# Patient Record
Sex: FEMALE | Race: WHITE | NOT HISPANIC OR LATINO
[De-identification: names, ages, dates, MRNs, and addresses within clinical notes are randomized per-mention and may not be internally consistent; named-entity substitution may affect disease eponyms.]

---

## 2017-11-18 ENCOUNTER — TRANSCRIPTION ENCOUNTER (OUTPATIENT)
Age: 27
End: 2017-11-18

## 2020-03-09 ENCOUNTER — APPOINTMENT (OUTPATIENT)
Dept: ORTHOPEDIC SURGERY | Facility: CLINIC | Age: 30
End: 2020-03-09
Payer: COMMERCIAL

## 2020-03-09 DIAGNOSIS — M54.5 LOW BACK PAIN: ICD-10-CM

## 2020-03-09 DIAGNOSIS — M51.36 OTHER INTERVERTEBRAL DISC DEGENERATION, LUMBAR REGION: ICD-10-CM

## 2020-03-09 PROCEDURE — 72100 X-RAY EXAM L-S SPINE 2/3 VWS: CPT

## 2020-03-09 PROCEDURE — 99204 OFFICE O/P NEW MOD 45 MIN: CPT

## 2020-04-27 ENCOUNTER — APPOINTMENT (OUTPATIENT)
Dept: ORTHOPEDIC SURGERY | Facility: CLINIC | Age: 30
End: 2020-04-27
Payer: COMMERCIAL

## 2020-04-27 DIAGNOSIS — M51.26 OTHER INTERVERTEBRAL DISC DISPLACEMENT, LUMBAR REGION: ICD-10-CM

## 2020-04-27 PROCEDURE — 99214 OFFICE O/P EST MOD 30 MIN: CPT | Mod: 95

## 2020-04-27 NOTE — REASON FOR VISIT
[Home] : at home, [unfilled] , at the time of the visit. [Other Location: e.g. Home (Enter Location, City,State)___] : at [unfilled] [Patient] : the patient [Follow-Up Visit] : a follow-up visit for

## 2020-04-27 NOTE — DISCUSSION/SUMMARY
[de-identified] : Low back pain.\par Left L5-S1 herniation.\par No leg pain.\par Much better, back to exercising.\par Discussed all options.\par Continue PT, NSAIDs PRN.\par Doing very well.\par All questions were answered, all alternatives discussed and the patient is in complete agreement with that plan. Follow-up appointment as instructed. Any issues and the patient will call or come in sooner.

## 2020-04-27 NOTE — HISTORY OF PRESENT ILLNESS
[Improving] : improving [de-identified] : Low back pain.\par now better\par Feeling better with time.\par Slowed with working out.\par No leg pain.\par Went for lumbar MRI 4/21/2020.\par Did PT 2X a week.\par No fever chills sweats nausea vomiting no bowel or bladder dysfunction, no recent weight loss or gain no night pain. This history is in addition to the intake form that I personally reviewed.

## 2020-04-27 NOTE — PHYSICAL EXAM
[Normal] : Gait: normal [SLR] : negative straight leg raise [de-identified] : adequate lower extremity motor strength, sensation is intact and symmetrical full range of motion flexion extension and rotation, no palpatory tenderness full range of motion of hips knees shoulders and elbows (all four extremities), no atrophy, negative straight leg raise, no swelling, normal ambulation, no apparent distress skin is intact, no upper or lower extremity instability, alert and oriented x3 and normal mood. Normal finger-to nose test.  [de-identified] : MRI lumbar [facility] 4/21/2020-small left L5-S1 herniation-reviewed with the patient.

## 2021-08-12 ENCOUNTER — NON-APPOINTMENT (OUTPATIENT)
Age: 31
End: 2021-08-12

## 2021-08-12 ENCOUNTER — APPOINTMENT (OUTPATIENT)
Dept: INTERNAL MEDICINE | Facility: CLINIC | Age: 31
End: 2021-08-12
Payer: COMMERCIAL

## 2021-08-12 VITALS
RESPIRATION RATE: 14 BRPM | OXYGEN SATURATION: 97 % | WEIGHT: 96 LBS | DIASTOLIC BLOOD PRESSURE: 72 MMHG | BODY MASS INDEX: 18.85 KG/M2 | HEIGHT: 60 IN | SYSTOLIC BLOOD PRESSURE: 110 MMHG | HEART RATE: 86 BPM | TEMPERATURE: 97.8 F

## 2021-08-12 PROCEDURE — 99385 PREV VISIT NEW AGE 18-39: CPT

## 2021-08-12 NOTE — HEALTH RISK ASSESSMENT
[Good] : ~his/her~  mood as  good [No falls in past year] : Patient reported no falls in the past year [0] : 2) Feeling down, depressed, or hopeless: Not at all (0)

## 2021-08-12 NOTE — HISTORY OF PRESENT ILLNESS
[de-identified] : 12 weeks gestation.\par Here today to establish care.\par History of anxiety- stopped the Lexapro prior to getting pregnancy\par Still with anxiety

## 2021-10-07 ENCOUNTER — APPOINTMENT (OUTPATIENT)
Dept: INTERNAL MEDICINE | Facility: CLINIC | Age: 31
End: 2021-10-07
Payer: COMMERCIAL

## 2021-10-07 PROCEDURE — 90686 IIV4 VACC NO PRSV 0.5 ML IM: CPT

## 2021-10-07 PROCEDURE — G0008: CPT

## 2021-10-21 ENCOUNTER — TRANSCRIPTION ENCOUNTER (OUTPATIENT)
Age: 31
End: 2021-10-21

## 2022-01-25 ENCOUNTER — APPOINTMENT (OUTPATIENT)
Dept: INTERNAL MEDICINE | Facility: CLINIC | Age: 32
End: 2022-01-25
Payer: COMMERCIAL

## 2022-01-25 PROCEDURE — 90471 IMMUNIZATION ADMIN: CPT

## 2022-01-25 PROCEDURE — 90715 TDAP VACCINE 7 YRS/> IM: CPT

## 2022-02-10 ENCOUNTER — APPOINTMENT (OUTPATIENT)
Dept: INTERNAL MEDICINE | Facility: CLINIC | Age: 32
End: 2022-02-10
Payer: COMMERCIAL

## 2022-02-10 PROCEDURE — 99213 OFFICE O/P EST LOW 20 MIN: CPT | Mod: 95

## 2022-02-10 NOTE — ASSESSMENT
[FreeTextEntry1] : [facility]- wants to restart Lexapro. 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 \par Time spent 15 minutes

## 2022-02-10 NOTE — HISTORY OF PRESENT ILLNESS
[Home] : at home, [unfilled] , at the time of the visit. [Medical Office: ([facility])___] : at the medical office located in  [Verbal consent obtained from patient] : the patient, [unfilled] [FreeTextEntry8] : Pt 38 weeks pregnant.\par Pt stopped Lexapro due to pregnancy\par since then pt complains of anxiety

## 2022-02-26 ENCOUNTER — APPOINTMENT (OUTPATIENT)
Dept: INTERNAL MEDICINE | Facility: CLINIC | Age: 32
End: 2022-02-26
Payer: COMMERCIAL

## 2022-02-26 VITALS
OXYGEN SATURATION: 92 % | DIASTOLIC BLOOD PRESSURE: 66 MMHG | HEART RATE: 97 BPM | SYSTOLIC BLOOD PRESSURE: 98 MMHG | TEMPERATURE: 98 F | HEIGHT: 60 IN | WEIGHT: 104 LBS | BODY MASS INDEX: 20.42 KG/M2 | RESPIRATION RATE: 16 BRPM

## 2022-02-26 DIAGNOSIS — M79.89 OTHER SPECIFIED SOFT TISSUE DISORDERS: ICD-10-CM

## 2022-02-26 DIAGNOSIS — K59.09 OTHER CONSTIPATION: ICD-10-CM

## 2022-02-26 PROCEDURE — 99214 OFFICE O/P EST MOD 30 MIN: CPT

## 2022-02-26 NOTE — PHYSICAL EXAM
[No Acute Distress] : no acute distress [Normal Sclera/Conjunctiva] : normal sclera/conjunctiva [PERRL] : pupils equal round and reactive to light [EOMI] : extraocular movements intact [No Respiratory Distress] : no respiratory distress  [Clear to Auscultation] : lungs were clear to auscultation bilaterally [No Accessory Muscle Use] : no accessory muscle use [Normal Rate] : normal rate  [Regular Rhythm] : with a regular rhythm [Normal S1, S2] : normal S1 and S2 [Soft] : abdomen soft [Non Tender] : non-tender [Non-distended] : non-distended [Normal Bowel Sounds] : normal bowel sounds [de-identified] : +1 left LE edema

## 2022-02-26 NOTE — HISTORY OF PRESENT ILLNESS
[FreeTextEntry8] : 31F presents for 1 day of left lower leg swelling. Denies rash, pain, purulent drainage, CP, dyspnea, wheezing. Recently had uncomplicated vaginal delivery on 2/22. Reports constipation but denies ab pain, hematochezia. \par

## 2022-02-26 NOTE — REVIEW OF SYSTEMS
[Fever] : no fever [Chills] : no chills [Night Sweats] : no night sweats [Chest Pain] : no chest pain [Palpitations] : no palpitations [Lower Ext Edema] : lower extremity edema [Shortness Of Breath] : no shortness of breath [Wheezing] : no wheezing [Cough] : no cough [Abdominal Pain] : no abdominal pain [Dyspnea on Exertion] : no dyspnea on exertion [Nausea] : no nausea [Constipation] : constipation [Diarrhea] : diarrhea [Vomiting] : no vomiting [Dysuria] : no dysuria

## 2022-02-26 NOTE — ASSESSMENT
[FreeTextEntry1] : Constipation: Start mag citrate until bowel movement. \par Leg swelling: Check LE doppler to rule out DVT. Check BW in 2 weeks to help rule out eclampsia/preeclampsia. \par

## 2022-02-28 ENCOUNTER — TRANSCRIPTION ENCOUNTER (OUTPATIENT)
Age: 32
End: 2022-02-28

## 2022-03-09 ENCOUNTER — TRANSCRIPTION ENCOUNTER (OUTPATIENT)
Age: 32
End: 2022-03-09

## 2022-03-09 LAB
ANION GAP SERPL CALC-SCNC: 13 MMOL/L
APPEARANCE: CLEAR
BACTERIA: NEGATIVE
BASOPHILS # BLD AUTO: 0.03 K/UL
BASOPHILS NFR BLD AUTO: 0.4 %
BILIRUBIN URINE: NEGATIVE
BLOOD URINE: NORMAL
BUN SERPL-MCNC: 11 MG/DL
CALCIUM SERPL-MCNC: 9.5 MG/DL
CHLORIDE SERPL-SCNC: 103 MMOL/L
CO2 SERPL-SCNC: 26 MMOL/L
COLOR: COLORLESS
CREAT SERPL-MCNC: 0.71 MG/DL
EGFR: 117 ML/MIN/1.73M2
EOSINOPHIL # BLD AUTO: 0.08 K/UL
EOSINOPHIL NFR BLD AUTO: 1 %
GLUCOSE QUALITATIVE U: NEGATIVE
GLUCOSE SERPL-MCNC: 101 MG/DL
HCT VFR BLD CALC: 39.1 %
HGB BLD-MCNC: 12.2 G/DL
HYALINE CASTS: 0 /LPF
IMM GRANULOCYTES NFR BLD AUTO: 0.6 %
KETONES URINE: NEGATIVE
LEUKOCYTE ESTERASE URINE: NEGATIVE
LYMPHOCYTES # BLD AUTO: 2.92 K/UL
LYMPHOCYTES NFR BLD AUTO: 36.4 %
MAN DIFF?: NORMAL
MCHC RBC-ENTMCNC: 30.7 PG
MCHC RBC-ENTMCNC: 31.2 GM/DL
MCV RBC AUTO: 98.2 FL
MICROSCOPIC-UA: NORMAL
MONOCYTES # BLD AUTO: 0.52 K/UL
MONOCYTES NFR BLD AUTO: 6.5 %
NEUTROPHILS # BLD AUTO: 4.43 K/UL
NEUTROPHILS NFR BLD AUTO: 55.1 %
NITRITE URINE: NEGATIVE
PH URINE: 7.5
PLATELET # BLD AUTO: 246 K/UL
POTASSIUM SERPL-SCNC: 3.6 MMOL/L
PROTEIN URINE: NEGATIVE
RBC # BLD: 3.98 M/UL
RBC # FLD: 12.3 %
RED BLOOD CELLS URINE: 1 /HPF
SODIUM SERPL-SCNC: 142 MMOL/L
SPECIFIC GRAVITY URINE: 1.01
SQUAMOUS EPITHELIAL CELLS: 0 /HPF
UROBILINOGEN URINE: NORMAL
WBC # FLD AUTO: 8.03 K/UL
WHITE BLOOD CELLS URINE: 2 /HPF

## 2022-03-16 ENCOUNTER — APPOINTMENT (OUTPATIENT)
Dept: INTERNAL MEDICINE | Facility: CLINIC | Age: 32
End: 2022-03-16

## 2022-05-12 ENCOUNTER — RX RENEWAL (OUTPATIENT)
Age: 32
End: 2022-05-12

## 2022-08-04 ENCOUNTER — APPOINTMENT (OUTPATIENT)
Dept: PEDIATRIC ALLERGY IMMUNOLOGY | Facility: CLINIC | Age: 32
End: 2022-08-04

## 2022-08-04 VITALS
HEIGHT: 60 IN | HEART RATE: 71 BPM | BODY MASS INDEX: 19.24 KG/M2 | SYSTOLIC BLOOD PRESSURE: 125 MMHG | OXYGEN SATURATION: 99 % | TEMPERATURE: 97.1 F | WEIGHT: 98 LBS | DIASTOLIC BLOOD PRESSURE: 80 MMHG

## 2022-08-04 PROCEDURE — 99203 OFFICE O/P NEW LOW 30 MIN: CPT

## 2022-08-04 NOTE — ASSESSMENT
[FreeTextEntry1] : 31 yr old with increase AR and AC with hot humid weather - ?? mold  or mite related\par \par Suggest\par 1. Increase Zyrtec 10 mg bid\par 2. Consider trial of Singulair 10 mg qd\par 3. Add azelastine nasal spray 0.15% 2s qd and azelastine eye drops\par 4. Continue Flonase 2s qd\par 5. Will consider ST in September when able to stop H1 blocker.\par \par Total MD time spent on this encounter was 35 minutes.  This includes time devoted to preparing to see the patient with review of previous medical record, obtaining medical history, performing physical exam, counseling and patient education with patient and family, ordering medications and lab studies, documentation in the medical record and coordination of care.\par \par \par

## 2022-08-04 NOTE — REASON FOR VISIT
[Initial Evaluation] : an initial evaluation of [Allergy Evaluation/ Skin Testing] : allergy evaluation and or skin testing [Runny Nose] : runny nose [Cough] : cough

## 2022-08-04 NOTE — REVIEW OF SYSTEMS
[Rhinorrhea] : rhinorrhea [Nasal Congestion] : nasal congestion [Post Nasal Drip] : post nasal drip [Nl] : Integumentary

## 2022-08-04 NOTE — PHYSICAL EXAM
[Alert] : alert [Well Nourished] : well nourished [No Discharge] : no discharge [Normal TMs] : both tympanic membranes were normal [No Thrush] : no thrush [Pale mucosa] : pale mucosa [Boggy Nasal Turbinates] : boggy and/or pale nasal turbinates [Posterior Pharyngeal Cobblestoning] : posterior pharyngeal cobblestoning [Clear Rhinorrhea] : clear rhinorrhea was seen [No Neck Mass] : no neck mass was observed [Normal Rate] : heart rate was normal  [Normal Cervical Lymph Nodes] : cervical [Skin Intact] : skin intact

## 2022-08-04 NOTE — SOCIAL HISTORY
[Spouse/Partner] : spouse/partner [Apartment] : [unfilled] lives in an apartment  [Central Forced Air] : heating provided by central forced air [Central] : air conditioning provided by central unit [Bedroom] :  in bedroom [Dog] : dog [] :  [de-identified] : son [FreeTextEntry2] : stay at home mom [Dust Mite Covers] : does not have dust mite covers [Living Area] : not in the living area [Smokers in Household] : there are no smokers in the home

## 2022-08-04 NOTE — HISTORY OF PRESENT ILLNESS
[de-identified] : 31 yr old not seen since 2017 - previous evaluation for chronic rhinitis with previous positive ST to grass pollen - now returns years later with several months complaints of increase sneezing,itchy watery eyes, rhinitis, post nasal drip with occasional nocturnal awakening. - all associated with in heat, humidity and move to new apartment with carpeting. \par Pt ahs been using Zyrtec 10 mg qd, Flonase 2s qd and Pataday PRN with partial relief. \par \par cannot ST today secondary to recent Zyrtec dose\par \par

## 2022-09-02 ENCOUNTER — APPOINTMENT (OUTPATIENT)
Dept: INTERNAL MEDICINE | Facility: CLINIC | Age: 32
End: 2022-09-02

## 2022-09-02 VITALS
HEIGHT: 60 IN | WEIGHT: 97 LBS | BODY MASS INDEX: 19.04 KG/M2 | HEART RATE: 66 BPM | DIASTOLIC BLOOD PRESSURE: 76 MMHG | SYSTOLIC BLOOD PRESSURE: 108 MMHG | TEMPERATURE: 98 F | RESPIRATION RATE: 14 BRPM

## 2022-09-02 PROCEDURE — 99395 PREV VISIT EST AGE 18-39: CPT

## 2022-09-02 RX ORDER — AZELASTINE HYDROCHLORIDE 205.5 UG/1
0.15 SPRAY, METERED NASAL
Qty: 1 | Refills: 3 | Status: DISCONTINUED | COMMUNITY
Start: 2022-08-04 | End: 2022-09-02

## 2022-09-02 RX ORDER — AZELASTINE HYDROCHLORIDE 0.5 MG/ML
0.05 SOLUTION/ DROPS OPHTHALMIC TWICE DAILY
Qty: 1 | Refills: 2 | Status: DISCONTINUED | COMMUNITY
Start: 2022-08-04 | End: 2022-09-02

## 2022-09-02 RX ORDER — KETOTIFEN FUMARATE 0.25 MG/ML
0.03 SOLUTION/ DROPS OPHTHALMIC
Qty: 1 | Refills: 3 | Status: DISCONTINUED | COMMUNITY
Start: 2022-08-08 | End: 2022-09-02

## 2022-09-02 NOTE — HISTORY OF PRESENT ILLNESS
[de-identified] : Pt here for CPE. Pt had a baby 6 months ago. Had GDM during pregnancy. Reports night sweats since giving birth, not every night.

## 2022-09-02 NOTE — PLAN
[FreeTextEntry1] : HCM:\par -check labs \par -UTD with pap smear \par \par Night sweats: likely related to post-pregnancy hormone changes, check labs, advised to f/u if persistent

## 2022-09-06 LAB
25(OH)D3 SERPL-MCNC: 63.3 NG/ML
ALBUMIN SERPL ELPH-MCNC: 4.7 G/DL
ALP BLD-CCNC: 52 U/L
ALT SERPL-CCNC: 16 U/L
ANION GAP SERPL CALC-SCNC: 10 MMOL/L
AST SERPL-CCNC: 21 U/L
BASOPHILS # BLD AUTO: 0.01 K/UL
BASOPHILS NFR BLD AUTO: 0.2 %
BILIRUB SERPL-MCNC: 0.5 MG/DL
BUN SERPL-MCNC: 16 MG/DL
CALCIUM SERPL-MCNC: 10.1 MG/DL
CHLORIDE SERPL-SCNC: 104 MMOL/L
CHOLEST SERPL-MCNC: 215 MG/DL
CO2 SERPL-SCNC: 28 MMOL/L
CREAT SERPL-MCNC: 0.91 MG/DL
EGFR: 86 ML/MIN/1.73M2
EOSINOPHIL # BLD AUTO: 0.07 K/UL
EOSINOPHIL NFR BLD AUTO: 1.7 %
ESTIMATED AVERAGE GLUCOSE: 97 MG/DL
FOLATE SERPL-MCNC: >20 NG/ML
GLUCOSE SERPL-MCNC: 91 MG/DL
HBA1C MFR BLD HPLC: 5 %
HCT VFR BLD CALC: 40.1 %
HDLC SERPL-MCNC: 70 MG/DL
HGB BLD-MCNC: 13 G/DL
IMM GRANULOCYTES NFR BLD AUTO: 0 %
LDLC SERPL CALC-MCNC: 135 MG/DL
LYMPHOCYTES # BLD AUTO: 1.95 K/UL
LYMPHOCYTES NFR BLD AUTO: 46.4 %
MAN DIFF?: NORMAL
MCHC RBC-ENTMCNC: 30 PG
MCHC RBC-ENTMCNC: 32.4 GM/DL
MCV RBC AUTO: 92.6 FL
MONOCYTES # BLD AUTO: 0.36 K/UL
MONOCYTES NFR BLD AUTO: 8.6 %
NEUTROPHILS # BLD AUTO: 1.81 K/UL
NEUTROPHILS NFR BLD AUTO: 43.1 %
NONHDLC SERPL-MCNC: 146 MG/DL
PLATELET # BLD AUTO: 173 K/UL
POTASSIUM SERPL-SCNC: 5 MMOL/L
PROT SERPL-MCNC: 7.1 G/DL
RBC # BLD: 4.33 M/UL
RBC # FLD: 12.4 %
SODIUM SERPL-SCNC: 142 MMOL/L
TRIGL SERPL-MCNC: 55 MG/DL
TSH SERPL-ACNC: 1.25 UIU/ML
VIT B12 SERPL-MCNC: 580 PG/ML
WBC # FLD AUTO: 4.2 K/UL

## 2022-09-13 ENCOUNTER — APPOINTMENT (OUTPATIENT)
Dept: PEDIATRIC ALLERGY IMMUNOLOGY | Facility: CLINIC | Age: 32
End: 2022-09-13

## 2022-09-13 VITALS
SYSTOLIC BLOOD PRESSURE: 108 MMHG | TEMPERATURE: 98.2 F | WEIGHT: 98 LBS | OXYGEN SATURATION: 97 % | BODY MASS INDEX: 19.49 KG/M2 | HEART RATE: 78 BPM | RESPIRATION RATE: 18 BRPM | HEIGHT: 59.3 IN | DIASTOLIC BLOOD PRESSURE: 70 MMHG

## 2022-09-13 DIAGNOSIS — J30.9 ALLERGIC RHINITIS, UNSPECIFIED: ICD-10-CM

## 2022-09-13 DIAGNOSIS — H10.10 ACUTE ATOPIC CONJUNCTIVITIS, UNSPECIFIED EYE: ICD-10-CM

## 2022-09-13 PROCEDURE — 95004 PERQ TESTS W/ALRGNC XTRCS: CPT

## 2022-09-13 PROCEDURE — 99213 OFFICE O/P EST LOW 20 MIN: CPT | Mod: 25

## 2022-09-13 RX ORDER — CETIRIZINE HCL 10 MG
10 TABLET ORAL
Refills: 0 | Status: ACTIVE | COMMUNITY

## 2022-09-13 RX ORDER — FLUTICASONE PROPIONATE 50 UG/1
50 SPRAY, METERED NASAL
Refills: 0 | Status: ACTIVE | COMMUNITY

## 2022-09-13 NOTE — SOCIAL HISTORY
[Spouse/Partner] : spouse/partner [College] : College [Apartment] : [unfilled] lives in an apartment  [Central Forced Air] : heating provided by central forced air [Central] : air conditioning provided by central unit [Bedroom] :  in bedroom [Dog] : dog [] :  [de-identified] : son [FreeTextEntry2] : stay at home mom [Dust Mite Covers] : does not have dust mite covers [Living Area] : not in the living area [Smokers in Household] : there are no smokers in the home [de-identified] : gym

## 2022-09-13 NOTE — PHYSICAL EXAM
[Alert] : alert [Well Nourished] : well nourished [Healthy Appearance] : healthy appearance [No Acute Distress] : no acute distress [Well Developed] : well developed [Normal Voice/Communication] : normal voice communication [Normal Pupil & Iris Size/Symmetry] : normal pupil and iris size and symmetry [No Discharge] : no discharge [No Photophobia] : no photophobia [Sclera Not Icteric] : sclera not icteric [Normal TMs] : both tympanic membranes were normal [Normal Lips/Tongue] : the lips and tongue were normal [Normal Outer Ear/Nose] : the ears and nose were normal in appearance [No Nasal Discharge] : no nasal discharge [Normal Tonsils] : normal tonsils [No Thrush] : no thrush [No Neck Mass] : no neck mass was observed [Normal Rate and Effort] : normal respiratory rhythm and effort [Bilateral Audible Breath Sounds] : bilateral audible breath sounds [Normal Rate] : heart rate was normal  [Normal Cervical Lymph Nodes] : cervical [Skin Intact] : skin intact  [No Rash] : no rash [Normal Mood] : mood was normal [Normal Affect] : affect was normal [Judgment and Insight Age Appropriate] : judgement and insight is age appropriate [Alert, Awake, Oriented as Age-Appropriate] : alert, awake, oriented as age appropriate [de-identified] : deviated septum with nasal valve collapse

## 2022-09-13 NOTE — REASON FOR VISIT
[Routine Follow-Up] : a routine follow-up visit for [Allergy Evaluation/ Skin Testing] : allergy evaluation and or skin testing [Itchy Eyes] : itchy eyes [To Medication] : allergy to medication [FreeTextEntry3] : post nasal drip

## 2022-09-13 NOTE — HISTORY OF PRESENT ILLNESS
[Asthma] : asthma [Eczematous rashes] : eczematous rashes [Food Allergies] : food allergies [de-identified] : 32 yr old  last seen 8/4/22 as follows:not seen since 2017 - previous evaluation for chronic rhinitis with previous positive ST to grass pollen - now returns years later with several months complaints of increase sneezing,itchy watery eyes, rhinitis, post nasal drip with occasional nocturnal awakening. - all associated with in heat, humidity and move to new apartment with carpeting. \par Pt has been using Zyrtec 10 mg qd, Flonase 2s qd and Pataday PRN with partial relief. \par \par Unable ST last visit as recent Zyrtec dose. Suggested:increase Zyrtec 10 mg bid, consider trial of Singulair 10 mg qd, add azelastine nasal spray 0.15% 2s qd and azelastine eye drops, continue Flonase 2s qd, and f/u for ST\par \par Patient now back today 9/13/22  for skin testing. Patient claims the use of Zyrtec 10mg 1 tab PO QD and Singulair 10mg 1 tab po qhs really helps. She also uses Flonase but was unable to continue with azelastine nasal spray due to its bad taste. Now although Zyrtec and Singulair help she still has breakthrough symptoms. Her symptoms have been terrible with itchy eyes, maxillary sinus pressure, sneezing and more PND since coming off both Zyrtec and Singulair for skin testing. Patient feels allergy symptoms are worse when she is indoors at her apartment. She does have a dog and carpeting which she is unable to remove. \par \par She had a hx of rhinoplasty at age 17. She is scheduled to see a new ENT next week as she feels there's been a collapse and re-deviation of her nose

## 2022-09-13 NOTE — REVIEW OF SYSTEMS
[Eye Itching] : itchy eyes [Rhinorrhea] : rhinorrhea [Nasal Congestion] : nasal congestion [Post Nasal Drip] : post nasal drip [Sneezing] : sneezing [Nl] : Integumentary [Immunizations are up to date] : Immunizations are up to date

## 2022-09-13 NOTE — ADDENDUM
[FreeTextEntry1] : Patient called 90 min after visit claiming skin test was continuing to react and she had evidence of some positives. Digital images of left forearm sent over. Based off photo evidence of large positive(+3) to grass pollen mix. Small positive(+2) to dog and tiny positive(+1) to sycam[place] sorrel. \par \par Aeroallergen ImmunoCAP will give us additional information and help us confirm accuracy of this ST.

## 2022-09-13 NOTE — ASSESSMENT
[FreeTextEntry1] : 32 yr old  with hx of AR , chronic rhinitis with previous positive ST to grass pollen, and rhinoplasty at age 17 presents with uncontrolled AR symptoms despite use of Zyrtec 10mg 1 tab PO QD, Singulair 10mg 1 tab po qhs, and Flonase 50mcg 2 sprays QD.\par \par Skin test today inconclusive as no histamine control\par \par Aeroallergen ImmunoCAP ordered\par \par Suggest: \par - Continue Zyrtec 10mg 1 tab PO QD, Singulair 10mg 1 tab po qhs, and Flonase 50mcg 2 sprays QD\par Will call patient with results\par \par Patient was seen with THA Singh\par \par \par Total MD time spent on this encounter was 25 minutes.  This includes time devoted to preparing to see the patient with review of previous medical record, obtaining medical history, performing physical exam, counseling and patient education with patient and family, ordering medications and lab studies, documentation in the medical record and coordination of care.\par

## 2022-09-26 ENCOUNTER — RX RENEWAL (OUTPATIENT)
Age: 32
End: 2022-09-26

## 2022-10-13 ENCOUNTER — APPOINTMENT (OUTPATIENT)
Dept: INTERNAL MEDICINE | Facility: CLINIC | Age: 32
End: 2022-10-13

## 2022-10-13 PROCEDURE — G0008: CPT

## 2022-10-13 PROCEDURE — 90686 IIV4 VACC NO PRSV 0.5 ML IM: CPT

## 2022-12-11 ENCOUNTER — RX RENEWAL (OUTPATIENT)
Age: 32
End: 2022-12-11

## 2023-03-12 ENCOUNTER — RX RENEWAL (OUTPATIENT)
Age: 33
End: 2023-03-12

## 2023-03-13 ENCOUNTER — APPOINTMENT (OUTPATIENT)
Dept: INTERNAL MEDICINE | Facility: CLINIC | Age: 33
End: 2023-03-13
Payer: COMMERCIAL

## 2023-03-13 DIAGNOSIS — U07.1 COVID-19: ICD-10-CM

## 2023-03-13 PROCEDURE — 99213 OFFICE O/P EST LOW 20 MIN: CPT | Mod: 95

## 2023-03-13 NOTE — PLAN
[FreeTextEntry1] : Supportive care.\par Pt will call if symptoms worsen\par I spent 21 minutes speaking to this pt via two-way video chat.

## 2023-03-13 NOTE — HISTORY OF PRESENT ILLNESS
[FreeTextEntry8] : Pt consents to this video consultation.\par Pt developed a tickle in her throat and a cough yesterday.\par Today she felt achy. Her temp was 100.6F. She then tested positive for covid.\par Has no symptoms other than body aches, mild cough, fever.

## 2023-03-13 NOTE — ASSESSMENT
[FreeTextEntry1] : Pt is low risk with mild COVID symptoms.\par Discussed paxlovid. I recommended not prescribing Paxlovid due to pt's low risk status and mild symptoms.\par Pt is in agreement with this plan

## 2023-06-09 ENCOUNTER — RX RENEWAL (OUTPATIENT)
Age: 33
End: 2023-06-09

## 2023-08-14 ENCOUNTER — APPOINTMENT (OUTPATIENT)
Dept: INTERNAL MEDICINE | Facility: CLINIC | Age: 33
End: 2023-08-14
Payer: COMMERCIAL

## 2023-08-14 VITALS
WEIGHT: 99 LBS | DIASTOLIC BLOOD PRESSURE: 68 MMHG | SYSTOLIC BLOOD PRESSURE: 100 MMHG | HEIGHT: 59.3 IN | TEMPERATURE: 98.2 F | RESPIRATION RATE: 18 BRPM | BODY MASS INDEX: 19.7 KG/M2

## 2023-08-14 DIAGNOSIS — J02.9 ACUTE PHARYNGITIS, UNSPECIFIED: ICD-10-CM

## 2023-08-14 DIAGNOSIS — B34.9 VIRAL INFECTION, UNSPECIFIED: ICD-10-CM

## 2023-08-14 PROCEDURE — 99213 OFFICE O/P EST LOW 20 MIN: CPT

## 2023-08-14 NOTE — HISTORY OF PRESENT ILLNESS
[FreeTextEntry8] : Patient is 32 y.o f presenting for an acute visit of sore throat, fever and muscle aches. Patient states that child was sick a week ago. then 2-3 days later started to feel the same symptoms. First body aches, then sore throat, culminating in a fever 2 days ago at 101.2 max temperature. Pt also admits to a post nasal drip. denies SOB, chest pain, nausea and vomiting. went to  and received a a covid test, strep and flu with all negative results. sore throat still persists, improves with cold drinks, hurts when swallowing.  also took a throat culture, results are pending.

## 2023-08-14 NOTE — PLAN
[FreeTextEntry1] : - no fever for 24 hours - negative covid, flu, strep in UC, throat culture pending - likely viral, symptoms overall improving - recommend supportive care - flonase for post nasal drip, tylenol/motrin PRN for body aches and/or fever

## 2023-08-14 NOTE — REVIEW OF SYSTEMS
[Sore Throat] : sore throat [Postnasal Drip] : postnasal drip [Fever] : no fever [Chills] : no chills [Night Sweats] : no night sweats [Vision Problems] : no vision problems [Earache] : no earache [Hoarseness] : no hoarseness [Chest Pain] : no chest pain [Palpitations] : no palpitations [Shortness Of Breath] : no shortness of breath [Wheezing] : no wheezing [Cough] : no cough [Abdominal Pain] : no abdominal pain [Nausea] : no nausea [Constipation] : no constipation [Diarrhea] : diarrhea [Vomiting] : no vomiting

## 2023-08-14 NOTE — PHYSICAL EXAM
[No Respiratory Distress] : no respiratory distress  [Clear to Auscultation] : lungs were clear to auscultation bilaterally [Normal Rate] : normal rate  [Regular Rhythm] : with a regular rhythm [Normal] : normal rate, regular rhythm, normal S1 and S2 and no murmur heard [No Focal Deficits] : no focal deficits [Alert and Oriented x3] : oriented to person, place, and time [de-identified] : erythematous oropharynx, exudates on left tonsil

## 2023-09-08 ENCOUNTER — RX RENEWAL (OUTPATIENT)
Age: 33
End: 2023-09-08

## 2023-09-12 ENCOUNTER — APPOINTMENT (OUTPATIENT)
Dept: INTERNAL MEDICINE | Facility: CLINIC | Age: 33
End: 2023-09-12
Payer: COMMERCIAL

## 2023-09-12 VITALS
DIASTOLIC BLOOD PRESSURE: 70 MMHG | OXYGEN SATURATION: 98 % | HEART RATE: 64 BPM | RESPIRATION RATE: 16 BRPM | HEIGHT: 59.3 IN | WEIGHT: 97 LBS | TEMPERATURE: 97.2 F | SYSTOLIC BLOOD PRESSURE: 100 MMHG | BODY MASS INDEX: 19.3 KG/M2

## 2023-09-12 DIAGNOSIS — Z00.00 ENCOUNTER FOR GENERAL ADULT MEDICAL EXAMINATION W/OUT ABNORMAL FINDINGS: ICD-10-CM

## 2023-09-12 PROCEDURE — 99395 PREV VISIT EST AGE 18-39: CPT

## 2023-09-14 LAB
ALBUMIN SERPL ELPH-MCNC: 4.7 G/DL
ALP BLD-CCNC: 53 U/L
ALT SERPL-CCNC: 12 U/L
ANION GAP SERPL CALC-SCNC: 10 MMOL/L
AST SERPL-CCNC: 20 U/L
BASOPHILS # BLD AUTO: 0.01 K/UL
BASOPHILS NFR BLD AUTO: 0.2 %
BILIRUB SERPL-MCNC: 0.6 MG/DL
BUN SERPL-MCNC: 12 MG/DL
CALCIUM SERPL-MCNC: 9.6 MG/DL
CHLORIDE SERPL-SCNC: 99 MMOL/L
CHOLEST SERPL-MCNC: 172 MG/DL
CO2 SERPL-SCNC: 25 MMOL/L
CREAT SERPL-MCNC: 0.86 MG/DL
EGFR: 91 ML/MIN/1.73M2
EOSINOPHIL # BLD AUTO: 0.09 K/UL
EOSINOPHIL NFR BLD AUTO: 1.9 %
ESTIMATED AVERAGE GLUCOSE: 100 MG/DL
GLUCOSE SERPL-MCNC: 90 MG/DL
HBA1C MFR BLD HPLC: 5.1 %
HCG SERPL-MCNC: 4701 MIU/ML
HCT VFR BLD CALC: 38.8 %
HDLC SERPL-MCNC: 69 MG/DL
HGB BLD-MCNC: 12.6 G/DL
IMM GRANULOCYTES NFR BLD AUTO: 0.2 %
LDLC SERPL CALC-MCNC: 93 MG/DL
LYMPHOCYTES # BLD AUTO: 2.09 K/UL
LYMPHOCYTES NFR BLD AUTO: 43.6 %
MAN DIFF?: NORMAL
MCHC RBC-ENTMCNC: 29.9 PG
MCHC RBC-ENTMCNC: 32.5 GM/DL
MCV RBC AUTO: 91.9 FL
MONOCYTES # BLD AUTO: 0.35 K/UL
MONOCYTES NFR BLD AUTO: 7.3 %
NEUTROPHILS # BLD AUTO: 2.24 K/UL
NEUTROPHILS NFR BLD AUTO: 46.8 %
NONHDLC SERPL-MCNC: 103 MG/DL
PLATELET # BLD AUTO: 158 K/UL
POTASSIUM SERPL-SCNC: 4.7 MMOL/L
PROT SERPL-MCNC: 7.1 G/DL
RBC # BLD: 4.22 M/UL
RBC # FLD: 12.8 %
SODIUM SERPL-SCNC: 134 MMOL/L
TRIGL SERPL-MCNC: 53 MG/DL
TSH SERPL-ACNC: 1.48 UIU/ML
WBC # FLD AUTO: 4.79 K/UL

## 2023-10-20 ENCOUNTER — APPOINTMENT (OUTPATIENT)
Dept: INTERNAL MEDICINE | Facility: CLINIC | Age: 33
End: 2023-10-20
Payer: COMMERCIAL

## 2023-10-20 VITALS
DIASTOLIC BLOOD PRESSURE: 60 MMHG | HEIGHT: 59.3 IN | HEART RATE: 61 BPM | TEMPERATURE: 98 F | WEIGHT: 98 LBS | BODY MASS INDEX: 19.49 KG/M2 | SYSTOLIC BLOOD PRESSURE: 90 MMHG | OXYGEN SATURATION: 98 % | RESPIRATION RATE: 14 BRPM

## 2023-10-20 DIAGNOSIS — Z23 ENCOUNTER FOR IMMUNIZATION: ICD-10-CM

## 2023-10-20 PROCEDURE — 90686 IIV4 VACC NO PRSV 0.5 ML IM: CPT

## 2023-10-20 PROCEDURE — G0008: CPT

## 2023-10-20 PROCEDURE — 99213 OFFICE O/P EST LOW 20 MIN: CPT | Mod: 25

## 2023-11-09 ENCOUNTER — APPOINTMENT (OUTPATIENT)
Dept: INTERNAL MEDICINE | Facility: CLINIC | Age: 33
End: 2023-11-09
Payer: COMMERCIAL

## 2023-11-09 VITALS
HEART RATE: 72 BPM | RESPIRATION RATE: 14 BRPM | SYSTOLIC BLOOD PRESSURE: 108 MMHG | OXYGEN SATURATION: 98 % | BODY MASS INDEX: 19.76 KG/M2 | WEIGHT: 98 LBS | DIASTOLIC BLOOD PRESSURE: 64 MMHG | TEMPERATURE: 98.5 F | HEIGHT: 59 IN

## 2023-11-09 DIAGNOSIS — J01.90 ACUTE SINUSITIS, UNSPECIFIED: ICD-10-CM

## 2023-11-09 PROCEDURE — 99214 OFFICE O/P EST MOD 30 MIN: CPT

## 2023-12-11 ENCOUNTER — RX RENEWAL (OUTPATIENT)
Age: 33
End: 2023-12-11

## 2023-12-26 ENCOUNTER — RX RENEWAL (OUTPATIENT)
Age: 33
End: 2023-12-26

## 2024-02-27 RX ORDER — AZITHROMYCIN 250 MG/1
250 TABLET, FILM COATED ORAL
Qty: 1 | Refills: 0 | Status: DISCONTINUED | COMMUNITY
Start: 2023-11-09 | End: 2024-02-27

## 2024-03-22 ENCOUNTER — TRANSCRIPTION ENCOUNTER (OUTPATIENT)
Age: 34
End: 2024-03-22

## 2024-05-27 ENCOUNTER — RX RENEWAL (OUTPATIENT)
Age: 34
End: 2024-05-27

## 2024-05-28 ENCOUNTER — APPOINTMENT (OUTPATIENT)
Dept: INTERNAL MEDICINE | Facility: CLINIC | Age: 34
End: 2024-05-28
Payer: COMMERCIAL

## 2024-05-28 VITALS — RESPIRATION RATE: 14 BRPM | DIASTOLIC BLOOD PRESSURE: 60 MMHG | SYSTOLIC BLOOD PRESSURE: 90 MMHG | TEMPERATURE: 98 F

## 2024-05-28 DIAGNOSIS — F41.1 GENERALIZED ANXIETY DISORDER: ICD-10-CM

## 2024-05-28 DIAGNOSIS — Z33.1 PREGNANT STATE, INCIDENTAL: ICD-10-CM

## 2024-05-28 PROCEDURE — 99213 OFFICE O/P EST LOW 20 MIN: CPT

## 2024-05-28 RX ORDER — SERTRALINE 25 MG/1
25 TABLET, FILM COATED ORAL
Qty: 90 | Refills: 1 | Status: ACTIVE | COMMUNITY
Start: 2023-10-20 | End: 1900-01-01

## 2024-05-28 NOTE — HISTORY OF PRESENT ILLNESS
[FreeTextEntry1] : Pt is here for follow-up. She is on sertraline 25 mg QD. She feels that this is a good  dose for her [de-identified] : Feeling well Pt is 22 weeks pregnant.

## 2024-10-09 ENCOUNTER — APPOINTMENT (OUTPATIENT)
Dept: INTERNAL MEDICINE | Facility: CLINIC | Age: 34
End: 2024-10-09
Payer: COMMERCIAL

## 2024-10-09 VITALS
SYSTOLIC BLOOD PRESSURE: 114 MMHG | DIASTOLIC BLOOD PRESSURE: 78 MMHG | HEIGHT: 59 IN | BODY MASS INDEX: 20.76 KG/M2 | TEMPERATURE: 98 F | WEIGHT: 103 LBS | HEART RATE: 78 BPM | RESPIRATION RATE: 14 BRPM

## 2024-10-09 DIAGNOSIS — J01.90 ACUTE SINUSITIS, UNSPECIFIED: ICD-10-CM

## 2024-10-09 PROCEDURE — 99213 OFFICE O/P EST LOW 20 MIN: CPT

## 2024-10-10 LAB
INFLUENZA A RESULT: NOT DETECTED
INFLUENZA B RESULT: NOT DETECTED
RESP SYN VIRUS RESULT: NOT DETECTED
SARS-COV-2 RESULT: NOT DETECTED

## 2024-10-10 RX ORDER — PREDNISONE 20 MG/1
20 TABLET ORAL DAILY
Qty: 6 | Refills: 0 | Status: ACTIVE | COMMUNITY
Start: 2024-10-10 | End: 1900-01-01

## 2024-10-15 ENCOUNTER — TRANSCRIPTION ENCOUNTER (OUTPATIENT)
Age: 34
End: 2024-10-15

## 2024-10-15 RX ORDER — DOXYCYCLINE 100 MG/1
100 TABLET, FILM COATED ORAL
Qty: 20 | Refills: 0 | Status: ACTIVE | COMMUNITY
Start: 2024-10-15 | End: 1900-01-01

## 2025-04-11 PROBLEM — Z00.00 ENCOUNTER FOR PREVENTIVE HEALTH EXAMINATION: Status: ACTIVE | Noted: 2025-04-11

## 2025-04-14 ENCOUNTER — NON-APPOINTMENT (OUTPATIENT)
Age: 35
End: 2025-04-14

## 2025-04-15 ENCOUNTER — APPOINTMENT (OUTPATIENT)
Dept: BREAST CENTER | Facility: CLINIC | Age: 35
End: 2025-04-15
Payer: COMMERCIAL

## 2025-04-15 VITALS
WEIGHT: 97 LBS | SYSTOLIC BLOOD PRESSURE: 106 MMHG | BODY MASS INDEX: 19.04 KG/M2 | HEART RATE: 70 BPM | DIASTOLIC BLOOD PRESSURE: 69 MMHG | HEIGHT: 60 IN

## 2025-04-15 DIAGNOSIS — N63.11 UNSPECIFIED LUMP IN THE RIGHT BREAST, UPPER OUTER QUADRANT: ICD-10-CM

## 2025-04-15 PROCEDURE — 99203 OFFICE O/P NEW LOW 30 MIN: CPT | Mod: 25

## 2025-04-15 PROCEDURE — 76642 ULTRASOUND BREAST LIMITED: CPT | Mod: RT

## 2025-05-19 ENCOUNTER — APPOINTMENT (OUTPATIENT)
Dept: INTERNAL MEDICINE | Facility: CLINIC | Age: 35
End: 2025-05-19

## 2025-08-12 ENCOUNTER — APPOINTMENT (OUTPATIENT)
Dept: BREAST CENTER | Facility: CLINIC | Age: 35
End: 2025-08-12

## 2025-08-12 VITALS — HEIGHT: 60 IN | WEIGHT: 97 LBS | BODY MASS INDEX: 19.04 KG/M2

## 2025-08-12 DIAGNOSIS — N64.89 OTHER SPECIFIED DISORDERS OF BREAST: ICD-10-CM

## 2025-08-12 DIAGNOSIS — Z12.31 ENCOUNTER FOR SCREENING MAMMOGRAM FOR MALIGNANT NEOPLASM OF BREAST: ICD-10-CM

## 2025-08-12 PROCEDURE — 99213 OFFICE O/P EST LOW 20 MIN: CPT | Mod: 25

## 2025-08-12 PROCEDURE — 76642 ULTRASOUND BREAST LIMITED: CPT | Mod: RT

## 2025-08-23 PROBLEM — N64.89 BREAST ASYMMETRY: Status: ACTIVE | Noted: 2025-08-12
